# Patient Record
Sex: MALE | Race: WHITE
[De-identification: names, ages, dates, MRNs, and addresses within clinical notes are randomized per-mention and may not be internally consistent; named-entity substitution may affect disease eponyms.]

---

## 2018-04-18 ENCOUNTER — HOSPITAL ENCOUNTER (EMERGENCY)
Dept: HOSPITAL 17 - PHEFT | Age: 38
Discharge: HOME | End: 2018-04-18
Payer: SELF-PAY

## 2018-04-18 VITALS
OXYGEN SATURATION: 97 % | RESPIRATION RATE: 18 BRPM | SYSTOLIC BLOOD PRESSURE: 146 MMHG | DIASTOLIC BLOOD PRESSURE: 89 MMHG | TEMPERATURE: 98 F | HEART RATE: 88 BPM

## 2018-04-18 VITALS — BODY MASS INDEX: 30.41 KG/M2 | WEIGHT: 200.62 LBS | HEIGHT: 68 IN

## 2018-04-18 DIAGNOSIS — S61.213A: Primary | ICD-10-CM
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
## 2018-04-18 PROCEDURE — 12001 RPR S/N/AX/GEN/TRNK 2.5CM/<: CPT

## 2018-04-18 NOTE — PD
HPI


Chief Complaint:  Laceration/Skin Injury


Time Seen by Provider:  18:10


Travel History


International Travel<30 days:  Yes ([place])


Contact w/Intl Traveler<30days:  No


Name of Country Traveled to:  [place], [place], [place], [place]


Traveled to known affect area:  No





History of Present Illness


HPI


37-year-old male presents emergency department for evaluation of a laceration 

to his left middle finger that occurred just prior to arrival.  Patient states 

that he was installing interior door the house when he caught his finger on the 

door and resulted in his laceration.  Patient states that he did bleed 

profusely and he was able to control the bleeding with pressure and gauze.  He 

denies any numbness tingling.  Denies any weakness of the finger.  Says his 

last tetanus is less than 4 years ago.  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  Patient is right-handed.





[facility]


Past Medical History


Medical History:  Denies Significant Hx


Heart Rhythm Problems:  No


Cancer:  No


Cardiovascular Problems:  Yes (RECURRENT PERICARDITIS)


Congestive Heart Failure:  No


Diabetes:  No


Diminished Hearing:  No


Endocrine:  No


Genitourinary:  No


Hepatitis:  No


Hiatal Hernia:  No


Hypertension:  No


Immune Disorder:  No


Musculoskeletal:  No


Neurologic:  No


Psychiatric:  No


Reproductive:  No


Respiratory:  No


Thyroid Disease:  No


Tetanus Vaccination:  < 5 Years


Influenza Vaccination:  Yes


Pregnant?:  Not Pregnant





Past Surgical History


Abdominal Surgery:  No


AICD:  No


Body Medical Devices:  HARDWARE RIGHT FEMUR/ LEFT FOREARM


Cardiac Surgery:  No


Ear Surgery:  No


Endocrine Surgery:  No


Eye Surgery:  No


Genitourinary Surgery:  No


Gynecologic Surgery:  No


Joint Replacement:  No


Oral Surgery:  No


Pacemaker:  No


Thoracic Surgery:  No


Other Surgery:  Yes (rhinoplasty)





Social History


Alcohol Use:  Yes (occ)


Tobacco Use:  No


Substance Use:  No





Allergies-Medications


(Allergen,Severity, Reaction):  


Coded Allergies:  


     No Known Allergies (Unverified , 9/30/14)


Reported Meds & Prescriptions





Reported Meds & Active Scripts


Active


Keflex (Cephalexin) 500 Mg Cap 500 Mg PO Q8H 7 Days








Review of Systems


Except as stated in HPI:  all other systems reviewed are Neg





Physical Exam


Narrative


GENERAL: Well-nourished, well-developed patient.


SKIN: Focused skin assessment warm/dry.


Left middle finger lateral aspect-a V-shaped laceration is present near the DIP

, measuring approximately 1cm.  Full range of motion of finger, neurovascularly 

intact. Bleeding controlled


HEAD: Normocephalic.


EYES: No scleral icterus. No injection or drainage. 


NECK: Supple, trachea midline. No JVD or lymphadenopathy.


CARDIOVASCULAR: Regular rate and rhythm without murmurs, gallops, or rubs. 


RESPIRATORY: Breath sounds equal bilaterally. No accessory muscle use.


GASTROINTESTINAL: Abdomen soft, non-tender, nondistended. 


MUSCULOSKELETAL: No cyanosis, or edema. 


BACK: Nontender without obvious deformity. No CVA tenderness.





Data


Data


Last Documented VS





Vital Signs








  Date Time  Temp Pulse Resp B/P (MAP) Pulse Ox O2 Delivery O2 Flow Rate FiO2


 


4/18/18 17:46 98.0 88 18 146/89 (108) 97   








Orders





 Orders


Ed Discharge Order (4/18/18 18:53)








MDM


Medical Decision Making


Medical Screen Exam Complete:  Yes


Emergency Medical Condition:  Yes


Differential Diagnosis


Left middle finger laceration, avulsion, abrasion


Narrative Course


37-year-old male presents emergency department for evaluation of a laceration 

to his left middle finger that occurred just prior to arrival.  Patient states 

that he was installing anterior drawer the house when he caught his finger on 

the door and resulted in his laceration.  Patient states that he did bleed 

profusely was able to control the bleeding with pressure and gauze.  He denies 

any numbness tingling.  Denies any weakness of the finger.  Says his last 

tetanus is less than 4 years ago.  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





Vital signs are stable.


Physical exam findings consistent with a V-shaped laceration to the left middle 

finger DIP.  Neurovascular intact.  No obvious involvement with tendons, nerves 

or vascular.  He is neurovascularly intact.


Laceration repair completed.


Wound care advised.  Patient states that he will go to the [employer] ship 

physician to have the sutures removed.


Keflex for prophylactic antibiotics.


Advised that if his finger develops increased redness, swelling or pain that he 

should return to the emergency department for further evaluation and treatment.





Procedures


**Procedure Narrative**


LACERATION


LOCATION: Left middle finger


LENGTH: 1 cm V-shaped


NUMBER OF STITCHES/STAPLES: 7





REPAIR: The area of the laceration was prepped with Betadine and sterilely 

draped.  A digital block was performed with 1% lidocaine without epinephrine.


The wound was copiously irrigated and explored without evidence of foreign body

, tendon injury or neurovascular  


injury.  The wound was closed using 5-0 Prolene. This was a single layer 

repair. A sterile dressing was applied. The patient was  


advised to keep the dressing clean and dry. Patient tolerated the procedure 

well.





Diagnosis





 Primary Impression:  


 Laceration of finger


 Qualified Codes:  S61.213A - Laceration without foreign body of left middle 

finger without damage to nail, initial encounter


Referrals:  


Primary Care Physician





***Additional Instructions:  


You have 7 sutures in place. Suture removal in 7-10 days.


Keep area clean and dry for 24 hours.


After 24 hours, you may bathe as normal but dry the area thoroughly.


You may use over-the-counter triple antibiotic ointments for your injury daily.


Change dressings daily.


If bleeding starts, apply pressure and elevate the area.


If you developed increased redness, swelling, or pain return to the emergency 

department as this could be a sign of infection.


You have received an antibiotic to reduce the possibility of developing an 

infection.


Scripts


Cephalexin (Keflex) 500 Mg Cap


500 MG PO Q8H for Infection for 7 Days, #21 CAP 0 Refills


   Prov: Sandeep Kendrick MD         4/18/18


Disposition:  01 DISCHARGE HOME


Condition:  Stable











Kaylin Bermudez Apr 18, 2018 18:13